# Patient Record
Sex: MALE | Race: WHITE | NOT HISPANIC OR LATINO
[De-identification: names, ages, dates, MRNs, and addresses within clinical notes are randomized per-mention and may not be internally consistent; named-entity substitution may affect disease eponyms.]

---

## 2024-03-25 PROBLEM — Z00.00 ENCOUNTER FOR PREVENTIVE HEALTH EXAMINATION: Status: ACTIVE | Noted: 2024-03-25

## 2024-04-18 ENCOUNTER — LABORATORY RESULT (OUTPATIENT)
Age: 54
End: 2024-04-18

## 2024-04-18 ENCOUNTER — APPOINTMENT (OUTPATIENT)
Dept: NEPHROLOGY | Facility: CLINIC | Age: 54
End: 2024-04-18
Payer: MEDICAID

## 2024-04-18 VITALS — WEIGHT: 167 LBS | BODY MASS INDEX: 26.84 KG/M2 | HEIGHT: 66 IN

## 2024-04-18 VITALS — DIASTOLIC BLOOD PRESSURE: 79 MMHG | SYSTOLIC BLOOD PRESSURE: 121 MMHG | HEART RATE: 67 BPM

## 2024-04-18 VITALS — HEART RATE: 66 BPM | SYSTOLIC BLOOD PRESSURE: 125 MMHG | DIASTOLIC BLOOD PRESSURE: 81 MMHG

## 2024-04-18 DIAGNOSIS — F51.04 PSYCHOPHYSIOLOGIC INSOMNIA: ICD-10-CM

## 2024-04-18 DIAGNOSIS — M54.50 LOW BACK PAIN, UNSPECIFIED: ICD-10-CM

## 2024-04-18 DIAGNOSIS — G89.29 LOW BACK PAIN, UNSPECIFIED: ICD-10-CM

## 2024-04-18 PROCEDURE — 99204 OFFICE O/P NEW MOD 45 MIN: CPT | Mod: 25

## 2024-04-18 PROCEDURE — 93000 ELECTROCARDIOGRAM COMPLETE: CPT

## 2024-04-18 RX ORDER — DUPILUMAB 300 MG/2ML
300 INJECTION, SOLUTION SUBCUTANEOUS
Refills: 0 | Status: ACTIVE | COMMUNITY

## 2024-04-18 NOTE — ADDENDUM
[FreeTextEntry1] : To complete EKG in clinic.  Given requisition for renal study and ultrasound. To complete tomorrow at [facility].  BP/HR taken in different positions (sitting standing and lying down) and d/w pt Self-monitoring at home advised i.e. BP, FS, etc. Medications updated Diet/healthy lifestyle counselling New labs ordered.  Follow up in 2-3 months.

## 2024-04-18 NOTE — END OF VISIT
[TextEntry] : All medical record entries have been made by the scribe, MAGNUS HANNON, at Dr. Germain Avila direction and personally dictated by me on 4/18/24. I have received the chart and agree that the record accurately reflects my personal performance of the history, physical exam, assessment, and plan. I have also personally directed, reviewed and agreed with the chart.

## 2024-04-18 NOTE — HISTORY OF PRESENT ILLNESS
[FreeTextEntry1] : 54 y/o M w/ PMHX of hematuria and eczema. This is his initial visit.   Pt. admits hematuria. Wife reports 2 episodes. Sees Dr. Garcia at [facility]. Wife also reports completed urinalysis and "found something."  Admits FMHX of kidney disease. Wife reports he had ulcers and GI issues since his teen years. Admits blood is visible in his urine.  Denies seeing a urologist.    Admits both endo/colonoscopy. Completed by Dr.  Admits occasional blood in the stool.   Admits occasional back pain. Says it localizes on his sides.   Wife admits that he lost weight. He lost about 20-30lb. Weight was around at least 190lb. He lost this weight in 6 months. Says he was trying to lose weight - wife reports he refrained from eating because of grief of losing both his parents.  Denies hospitalizations. Denies surgeries.  Pt. reports labwork supports that heme and iron was low.  Decline in RBC size.   Admits medication adherence - only to Dupixent for severe eczema.  Taking it for about 6-7 years. They are biweekly injections of 300mg. Followed by Dr. Aguilar at [facility] (T: [phone])  Wife admits he has poor eyesight.   ROS is negative otherwise.  FAMILY HX: Mom (d, 74) from kidney disease.  Dad (d)  SOCIAL HX: Born and raised in [place]. .  Works office job. No pets. Has not traveled recently.

## 2024-04-19 LAB
24R-OH-CALCIDIOL SERPL-MCNC: 52 PG/ML
25(OH)D3 SERPL-MCNC: 20.9 NG/ML
ALBUMIN SERPL ELPH-MCNC: 4.9 G/DL
ALP BLD-CCNC: 65 U/L
ALT SERPL-CCNC: 15 U/L
ANION GAP SERPL CALC-SCNC: 15 MMOL/L
APPEARANCE: CLEAR
APTT BLD: 35 SEC
AST SERPL-CCNC: 20 U/L
BACTERIA: NEGATIVE /HPF
BILIRUB SERPL-MCNC: 0.3 MG/DL
BILIRUBIN URINE: NEGATIVE
BLOOD URINE: ABNORMAL
BUN SERPL-MCNC: 13 MG/DL
C3 SERPL-MCNC: 114 MG/DL
C4 SERPL-MCNC: 29 MG/DL
CALCIUM SERPL-MCNC: 9.7 MG/DL
CALCIUM SERPL-MCNC: 9.7 MG/DL
CAST: 0 /LPF
CHLORIDE ?TM UR-SCNC: 94 MMOL/L
CHLORIDE SERPL-SCNC: 101 MMOL/L
CHOLEST SERPL-MCNC: 183 MG/DL
CO2 SERPL-SCNC: 24 MMOL/L
COLOR: YELLOW
CREAT SERPL-MCNC: 0.95 MG/DL
CREAT SPEC-SCNC: 62 MG/DL
CREAT/PROT UR: 0.1 RATIO
CRP SERPL-MCNC: <3 MG/L
CYSTATIN C SERPL-MCNC: 1.03 MG/L
EGFR: 96 ML/MIN/1.73M2
EPITHELIAL CELLS: 0 /HPF
ERYTHROCYTE [SEDIMENTATION RATE] IN BLOOD BY WESTERGREN METHOD: 6 MM/HR
ESTIMATED AVERAGE GLUCOSE: 105 MG/DL
FERRITIN SERPL-MCNC: 160 NG/ML
FOLATE SERPL-MCNC: 16.6 NG/ML
GFR/BSA.PRED SERPLBLD CYS-BASED-ARV: 77 ML/MIN/1.73M2
GLUCOSE QUALITATIVE U: NEGATIVE MG/DL
GLUCOSE SERPL-MCNC: 82 MG/DL
HBA1C MFR BLD HPLC: 5.3 %
HBV SURFACE AB SER QL: NONREACTIVE
HBV SURFACE AG SER QL: NONREACTIVE
HCT VFR BLD CALC: 39.8 %
HCV AB SER QL: NONREACTIVE
HCV S/CO RATIO: 0.08 S/CO
HCYS SERPL-MCNC: 10.5 UMOL/L
HDLC SERPL-MCNC: 61 MG/DL
HGB BLD-MCNC: 12.6 G/DL
INR PPP: 0.97 RATIO
IRON SATN MFR SERPL: 18 %
IRON SERPL-MCNC: 62 UG/DL
KETONES URINE: NEGATIVE MG/DL
LDLC SERPL CALC-MCNC: 113 MG/DL
LEUKOCYTE ESTERASE URINE: NEGATIVE
MAGNESIUM SERPL-MCNC: 2.3 MG/DL
MCHC RBC-ENTMCNC: 25.7 PG
MCHC RBC-ENTMCNC: 31.7 GM/DL
MCV RBC AUTO: 81.2 FL
MICROSCOPIC-UA: NORMAL
NITRITE URINE: NEGATIVE
NONHDLC SERPL-MCNC: 123 MG/DL
OSMOLALITY UR: 418 MOSM/KG
PARATHYROID HORMONE INTACT: 34 PG/ML
PH URINE: 6.5
PHOSPHATE SERPL-MCNC: 3.9 MG/DL
PLATELET # BLD AUTO: 221 K/UL
POTASSIUM SERPL-SCNC: 3.8 MMOL/L
POTASSIUM UR-SCNC: 33.7 MMOL/L
PROT SERPL-MCNC: 7 G/DL
PROT UR-MCNC: 6 MG/DL
PROTEIN URINE: NEGATIVE MG/DL
PSA FREE FLD-MCNC: 46 %
PSA FREE SERPL-MCNC: 0.29 NG/ML
PSA SERPL-MCNC: 0.63 NG/ML
PT BLD: 11 SEC
RBC # BLD: 4.9 M/UL
RBC # FLD: 13.1 %
RED BLOOD CELLS URINE: 7 /HPF
RHEUMATOID FACT SER QL: <10 IU/ML
SODIUM ?TM SUB UR QN: 96 MMOL/L
SODIUM SERPL-SCNC: 140 MMOL/L
SPECIFIC GRAVITY URINE: 1.01
T3FREE SERPL-MCNC: 3.3 PG/ML
T3RU NFR SERPL: 1 TBI
T4 FREE SERPL-MCNC: 1.5 NG/DL
T4 SERPL-MCNC: 9.4 UG/DL
THYROGLOB AB SERPL-ACNC: <20 IU/ML
THYROPEROXIDASE AB SERPL IA-ACNC: <10 IU/ML
TIBC SERPL-MCNC: 343 UG/DL
TRIGL SERPL-MCNC: 53 MG/DL
TSH SERPL-ACNC: 1.34 UIU/ML
UIBC SERPL-MCNC: 281 UG/DL
URATE SERPL-MCNC: 6.3 MG/DL
UROBILINOGEN URINE: 0.2 MG/DL
VIT B12 SERPL-MCNC: 577 PG/ML
WBC # FLD AUTO: 4.89 K/UL
WHITE BLOOD CELLS URINE: 2 /HPF

## 2024-04-20 LAB
ALBUMIN MFR SERPL ELPH: 63.5 %
ALBUMIN SERPL-MCNC: 4.4 G/DL
ALBUMIN/GLOB SERPL: 1.7 RATIO
ALPHA1 GLOB MFR SERPL ELPH: 3.3 %
ALPHA1 GLOB SERPL ELPH-MCNC: 0.2 G/DL
ALPHA2 GLOB MFR SERPL ELPH: 8.9 %
ALPHA2 GLOB SERPL ELPH-MCNC: 0.6 G/DL
B-GLOBULIN MFR SERPL ELPH: 10.6 %
B-GLOBULIN SERPL ELPH-MCNC: 0.7 G/DL
BACTERIA UR CULT: NORMAL
DEPRECATED KAPPA LC FREE/LAMBDA SER: 1.68 RATIO
DEPRECATED LTX IGE RAST QL: 0
GAMMA GLOB FLD ELPH-MCNC: 1 G/DL
GAMMA GLOB MFR SERPL ELPH: 13.7 %
IGA 24H UR QL IFE: NORMAL
IGA SER QL IEP: 128 MG/DL
IGG SER QL IEP: 982 MG/DL
IGM SER QL IEP: 49 MG/DL
INTERPRETATION SERPL IEP-IMP: NORMAL
KAPPA LC CSF-MCNC: 0.97 MG/DL
KAPPA LC SERPL-MCNC: 1.63 MG/DL
LTX IGE QN: <0.1 KUA/L
M PROTEIN SPEC IFE-MCNC: NORMAL
PROT SERPL-MCNC: 7 G/DL
PROT SERPL-MCNC: 7 G/DL

## 2024-04-21 ENCOUNTER — TRANSCRIPTION ENCOUNTER (OUTPATIENT)
Age: 54
End: 2024-04-21

## 2024-04-22 LAB
ANA SER IF-ACNC: NEGATIVE
GASTRIN SERPL-MCNC: 24 PG/ML

## 2024-04-23 LAB
ALP BONE SERPL-MCNC: 12.3 UG/L
C1Q IMMUNE COMPLEX: <1.2 UG EQ/ML

## 2024-04-24 LAB — COLLAGEN CTX SERPL-MCNC: 216 PG/ML

## 2024-04-25 LAB — METHYLMALONATE SERPL-SCNC: 127 NMOL/L

## 2024-05-03 ENCOUNTER — TRANSCRIPTION ENCOUNTER (OUTPATIENT)
Age: 54
End: 2024-05-03

## 2024-05-03 ENCOUNTER — APPOINTMENT (OUTPATIENT)
Dept: NEPHROLOGY | Facility: CLINIC | Age: 54
End: 2024-05-03
Payer: MEDICAID

## 2024-05-03 ENCOUNTER — APPOINTMENT (OUTPATIENT)
Dept: UROLOGY | Facility: CLINIC | Age: 54
End: 2024-05-03
Payer: MEDICAID

## 2024-05-03 PROCEDURE — 99204 OFFICE O/P NEW MOD 45 MIN: CPT

## 2024-05-03 PROCEDURE — 99214 OFFICE O/P EST MOD 30 MIN: CPT

## 2024-05-03 NOTE — HISTORY OF PRESENT ILLNESS
[FreeTextEntry1] : Name MADDIE LI MRN [id number]  Sep 16 1970 ------------------------------------------------------------------------------------------------------------------------------------------- Date of First Visit:  2024  Referring Provider/PCP: Dr. Avila  ------------------------------------------------------------------------------------------------------------------------------------------- CC: kidney stones  History of Present Illness: MADDIE LI is a 53-year-old male with no significant PMH who presents for evaluation of kidney stones. He recently saw Dr. Avila for evaluation of gross hematuria and bilateral back/flank pain. CT demonstrated mild/moderate left hydronephrosis secondary to 3 stones in the upper ureter, largest 5x12mm as well as bilateral non obstructing stones (see full CT report below). Labs on : creatinine 0.95, WBC 4.89, UCx negative. He has been experiencing intermittent bilateral back pain, he is unsure which side is more bothersome but believes bilateral pain was present at the time of the CT scan. Had one episode of nausea but denies fever, chills, vomiting, severe pain, urinary complaints. Not taking medications for symptoms.   Imaging: CT scan from 2024 (report only). Findings: Sub centimeter, nonobstructive stones in each kidney, at least 3 particles on the right and 2 on the left; close proximity of small stones might result in underestimation of overall number. Largest on the left is about 5mm, on the right 8mm. Right urinary tract otherwise unremarkable. Mild/moderate hydronephrosis on the left, etiology being additional stones within the mid to upper ureter. 3 particles are present, largest and most caudal being about 5x12mm, above this, others are 1-2mm each. Subsequent ureter, bladder unremarkable.   Previous urine cultures:  2024: <10,000 CFU/mL Normal Urogenital Rita  Kidney Stone History:  First-time stone former - No Concurrent asymptomatic stone(s) - Yes Previous stone surgeries - No   Previous passed stones - No  Comorbidities - non-contributory Family history of kidney stones - Brother, both parents  Previous metabolic evaluation - No

## 2024-05-03 NOTE — ASSESSMENT
[FreeTextEntry1] : Assessment:   MADDIE LI is a 53 year old male with urolithiasis. He currently has a 5x12mm stone in the left upper ureter with hydronephrosis and bilateral non obstructing stones with bilateral back vs flank pain.  Unremarkable labs from 4/18.  I discussed the management of urolithiasis with the patient:   Watchful Waiting +/- Medical Expulsive Therapy (MET):   We can continue to watch the stone and will generally give up to 4 weeks for stone passage. The likelihood of passage goes down with increased stone size and more proximal location. However, I explained that there is always a risk that the stone could become more symptomatic (pain, infection) as it passes or not pass at all, which would require surgical treatment. MET is a conservative option wherein medications (most commonly, an alpha-blocker) and analgesia are prescribed to help expedite the passage of the ureteral stone. In general, it is reserved for distal stones between 6-10 mm, though contemporary scientific evidence is conflicting. The strength of the evidence for MET in small (<6 mm) stones in the distal ureter is weak, though it might still have a clinical benefit in larger ureteral stones (>5 mm). At the same time, risks associated with short-course alpha-blocker therapy are very low and likely outweighed by the potential upside of hastening stone passage.      Shock Wave Lithotripsy (SWL):   This is the least invasive form of surgery for stones and an excellent option for select stones. For ureteral stones, SWL is limited to the upper ureter. I explained how the procedure is performed and the concept behind shock waves. Procedural success is dependent on several stone and environmental factors such as the stone composition or density on CT, the presence or absence of hydronephrosis, size of the stone, stone location, and skin-to-stone distance on CT scan (patients body habitus). For these reasons, not all stones/patients are good candidates for SWL. The chances of being stone free after SWL are often much lower compared to other modalities, such as ureteroscopy, except in select cases where stone-free rates are comparable. Therefore, there is a risk that the patient would need subsequent procedures to render them stone-free. Since this is a non-invasive procedure, we are relying on the kidney to spontaneously pass the resultant stone fragments. At the same time, this procedure does carry some perioperative risks, mainly bleeding and infection, as well as the small risk of developing obstruction due to passage of stone fragments, which could require urgent placement of a double-J ureteral stent or nephrostomy tube.    Ureteroscopy (URS):   I explained the technique in detail and how it is performed. Though more invasive than SWL, stone-free rates are higher with this procedure for many stones, including those in the distal ureter and larger stones in the kidney and other segments of the ureter. Unlike SWL, its success rate is far less dependent on stone hardness, an unknown in many patients until the time of surgery. With regards to surgical outcomes, true (100%) stone-free rates approach 90% for ureteral stones and are more conservative (~60%) for renal stones - importantly, these rates are directly correlated to the total stone size/burden. Very commonly, a ureteral stent is left in place at the conclusion of the procedure, but only if needed. I explained that up to 80% of patients have some degree of stent-related symptoms, including flank pain, urinary urgency, pain with urination, blood in the urine, or general discomfort. If a stent is placed, it would need to be removed either cystoscopically under local anesthesia or it may have a string left externally through the urethra for removal in the office approximately 1 week after the procedure.  Risks of ureteroscopy include, but are not limited to, infection (5%), bleeding, injury to the bladder or ureter, ureteral perforation, ureteral stricture, and other risks involved with general anesthesia. There is also the risk that the procedure needs to be staged into more than one session based on the patient's internal anatomy and the size of the stone(s). Finally, dilation of the ureter and/or ureteral stent placement prior to definitive ureteroscopy may be necessary to achieve ureteral access safely.    I explained all these options to the patient and my assessment of the risks and benefits of each procedure for their stone(s).     I have answered all the patient's questions.  Given his persistent symptoms, we also discussed urgently inserting a ureteral stent to relieve obstruction.  We also discussed emergent symptoms and when to seek immediate care including fever, chills, nausea, vomiting, worsening pain, or urinary complaints. He would like to continue to monitor his symptoms but expressed understanding of importance of proceeding to ER for urgent evaluation if emergent symptoms occur.   Plan:  -Ibuprofen as needed for pain -Present to the [facility] ED for further evaluation and possible stent placement if he develops fever, chills, nausea, vomiting, severe pain, urinary symptoms. -If pain is well managed, follow up with Dr. Rocha as scheduled on 5/8 for further discussion of stone management options.  -Requested CT images

## 2024-05-03 NOTE — REASON FOR VISIT
[Initial Visit ___] : [unfilled] is here today for an initial visit  for [unfilled] [Home] : at home, [unfilled] , at the time of the visit. [Medical Office: ([facility])___] : at the medical office located in  [Patient] : the patient [Spouse] : spouse

## 2024-05-08 ENCOUNTER — APPOINTMENT (OUTPATIENT)
Dept: UROLOGY | Facility: CLINIC | Age: 54
End: 2024-05-08
Payer: MEDICAID

## 2024-05-08 VITALS
WEIGHT: 168 LBS | HEART RATE: 72 BPM | BODY MASS INDEX: 27 KG/M2 | OXYGEN SATURATION: 98 % | SYSTOLIC BLOOD PRESSURE: 107 MMHG | DIASTOLIC BLOOD PRESSURE: 67 MMHG | TEMPERATURE: 97.9 F | HEIGHT: 66 IN

## 2024-05-08 DIAGNOSIS — Z84.1 FAMILY HISTORY OF DISORDERS OF KIDNEY AND URETER: ICD-10-CM

## 2024-05-08 DIAGNOSIS — R31.9 HEMATURIA, UNSPECIFIED: ICD-10-CM

## 2024-05-08 DIAGNOSIS — N20.0 CALCULUS OF KIDNEY: ICD-10-CM

## 2024-05-08 PROCEDURE — 99214 OFFICE O/P EST MOD 30 MIN: CPT

## 2024-05-08 RX ORDER — TAMSULOSIN HYDROCHLORIDE 0.4 MG/1
0.4 CAPSULE ORAL
Qty: 14 | Refills: 0 | Status: ACTIVE | COMMUNITY
Start: 2024-05-08 | End: 1900-01-01

## 2024-05-08 RX ORDER — OXYBUTYNIN CHLORIDE 10 MG/1
10 TABLET, EXTENDED RELEASE ORAL
Qty: 14 | Refills: 0 | Status: ACTIVE | COMMUNITY
Start: 2024-05-08 | End: 1900-01-01

## 2024-05-08 RX ORDER — PHENAZOPYRIDINE HYDROCHLORIDE 100 MG/1
100 TABLET ORAL 3 TIMES DAILY
Qty: 9 | Refills: 0 | Status: ACTIVE | COMMUNITY
Start: 2024-05-08 | End: 1900-01-01

## 2024-05-08 NOTE — REVIEW OF SYSTEMS
[see HPI] : see HPI [Negative] : Heme/Lymph Island Pedicle Flap-Requiring Vessel Identification Text: The defect edges were debeveled with a #15 scalpel blade.  Given the location of the defect, shape of the defect and the proximity to free margins an island pedicle advancement flap was deemed most appropriate.  Using a sterile surgical marker, an appropriate advancement flap was drawn, based on the axial vessel mentioned above, incorporating the defect, outlining the appropriate donor tissue and placing the expected incisions within the relaxed skin tension lines where possible.    The area thus outlined was incised deep to adipose tissue with a #15 scalpel blade.  The skin margins were undermined to an appropriate distance in all directions around the primary defect and laterally outward around the island pedicle utilizing iris scissors.  There was minimal undermining beneath the pedicle flap.

## 2024-05-09 NOTE — ADDENDUM
[FreeTextEntry1] : I, Dr. Landon Rocha, personally performed the evaluation and management (E/M) services for this established patient with new problem(s)/exacerbation of existing condition(s). That E/M includes conducting the clinically appropriate interval history &/or physical exam, assessing all new/exacerbated conditions, and establishing a new care plan. My NP, Mayra Mora, was here to observe my E/M services for this patient.

## 2024-05-09 NOTE — HISTORY OF PRESENT ILLNESS
[FreeTextEntry1] :     Name MADDIE LI MRN [id number]  Sep 16 1970 ------------------------------------------------------------------------------------------------------------------------------------------- Date of First Visit: 2024 Referring Provider/PCP: Dr. Avila ------------------------------------------------------------------------------------------------------------------------------------------- CC: kidney stones  History of Present Illness: MADDIE LI is a 53-year-old male with no significant PMH who presents for evaluation of kidney stones. He recently saw Dr. Avila for evaluation of gross hematuria and bilateral back/flank pain. CT demonstrated mild/moderate left hydronephrosis secondary to 3 stones in the upper ureter, largest 5x12mm as well as bilateral non obstructing stones (see full CT report below). Labs on : creatinine 0.95, WBC 4.89, UCx negative. He has been experiencing intermittent bilateral back pain, he is unsure which side is more bothersome but believes bilateral pain was present at the time of the CT scan. Had one episode of nausea but denies fever, chills, vomiting, severe pain, urinary complaints. Not taking medications for symptoms.  Imaging: CT scan from 2024 (report only). Findings: Sub centimeter, nonobstructive stones in each kidney, at least 3 particles on the right and 2 on the left; close proximity of small stones might result in underestimation of overall number. Largest on the left is about 5mm, on the right 8mm. Right urinary tract otherwise unremarkable. Mild/moderate hydronephrosis on the left, etiology being additional stones within the mid to upper ureter. 3 particles are present, largest and most caudal being about 5x12mm, above this, others are 1-2mm each. Subsequent ureter, bladder unremarkable.  Previous urine cultures: 2024: <10,000 CFU/mL Normal Urogenital Rita  Kidney Stone History: First-time stone former - No Concurrent asymptomatic stone(s) - Yes Previous stone surgeries - No Previous passed stones - No Comorbidities - non-contributory Family history of kidney stones - Brother, both parents Previous metabolic evaluation - No -------------------------------------------------------------------------------------------------------------------------------------------  Interval History (2024):  Here for follow up and to further discuss surgical management of kidney stones. Personal review of CT scan shows 3 stones in the left ureter, the largest measuring 5x12mm and several bilateral nonobstructing stones.

## 2024-05-09 NOTE — ASSESSMENT
[FreeTextEntry1] : Assessment:   MADDIE LI is a 53 year M with urolithiasis. He currently has 3 stones in the left ureter, the largest measuring 5x12mm and several bilateral nonobstructing stones.    I discussed the management of urolithiasis with the patient:    Watchful Waiting +/- Medical Expulsive Therapy (MET):   We can continue to watch the stone and will generally give up to 4 weeks for stone passage. The likelihood of passage goes down with increased stone size and more proximal location. However, I explained that there is always a risk that the stone could become more symptomatic (pain, infection) as it passes or not pass at all, which would require surgical treatment. MET is a conservative option wherein medications (most commonly, an alpha-blocker) and analgesia are prescribed to help expedite the passage of the ureteral stone. In general, it is reserved for distal stones between 6-10 mm, though contemporary scientific evidence is conflicting. The strength of the evidence for MET in small (<6 mm) stones in the distal ureter is weak, though it might still have a clinical benefit in larger ureteral stones (>5 mm). At the same time, risks associated with short-course alpha-blocker therapy are very low and likely outweighed by the potential upside of hastening stone passage.      Shock Wave Lithotripsy (SWL):   This is the least invasive form of surgery for stones and an excellent option for select stones. For ureteral stones, SWL is limited to the upper ureter. I explained how the procedure is performed and the concept behind shock waves. Procedural success is dependent on several stone and environmental factors such as the stone composition or density on CT, the presence or absence of hydronephrosis, size of the stone, stone location, and skin-to-stone distance on CT scan (patients body habitus). For these reasons, not all stones/patients are good candidates for SWL. The chances of being stone free after SWL are often much lower compared to other modalities, such as ureteroscopy, except in select cases where stone-free rates are comparable. Therefore, there is a risk that the patient would need subsequent procedures to render them stone-free. Since this is a non-invasive procedure, we are relying on the kidney to spontaneously pass the resultant stone fragments. At the same time, this procedure does carry some perioperative risks, mainly bleeding and infection, as well as the small risk of developing obstruction due to passage of stone fragments, which could require urgent placement of a double-J ureteral stent or nephrostomy tube.    Ureteroscopy (URS):   I explained the technique in detail and how it is performed. Though more invasive than SWL, stone-free rates are higher with this procedure for many stones, including those in the distal ureter and larger stones in the kidney and other segments of the ureter. Unlike SWL, its success rate is far less dependent on stone hardness, an unknown in many patients until the time of surgery. With regards to surgical outcomes, true (100%) stone-free rates approach 90% for ureteral stones and are more conservative (~60%) for renal stones - importantly, these rates are directly correlated to the total stone size/burden. Very commonly, a ureteral stent is left in place at the conclusion of the procedure, but only if needed. I explained that up to 80% of patients have some degree of stent-related symptoms, including flank pain, urinary urgency, pain with urination, blood in the urine, or general discomfort. If a stent is placed, it would need to be removed either cystoscopically under local anesthesia or it may have a string left externally through the urethra for removal in the office approximately 1 week after the procedure.  Risks of ureteroscopy include, but are not limited to, infection (5%), bleeding, injury to the bladder or ureter, ureteral perforation, ureteral stricture, and other risks involved with general anesthesia. There is also the risk that the procedure needs to be staged into more than one session based on the patient's internal anatomy and the size of the stone(s). Finally, dilation of the ureter and/or ureteral stent placement prior to definitive ureteroscopy may be necessary to achieve ureteral access safely.    I explained all these options to the patient and my assessment of the risks and benefits of each procedure for their stone(s).     I have answered all the patients questions and the patient has elected to undergo a ureteroscopy.     Plan:   -Schedule for left ureteroscopy with laser lithotripsy with CVAC  -Pre-operative labs, UA/UCx recently done  -Upload CT into PACS -Medical clearance requested  -Post op meds sent - tamsulosin, oxybutynin, pyridium

## 2024-05-13 ENCOUNTER — TRANSCRIPTION ENCOUNTER (OUTPATIENT)
Age: 54
End: 2024-05-13

## 2024-05-13 NOTE — ASU PATIENT PROFILE, ADULT - NS PREOP UNDERSTANDS INFO
bring photo id, insurance , credit cards, npo from midnight, . NO jewelry , no valuables ,. No smoking/drugs /alcohol. Wear loose comfort clothes. Must have an escort. Address, phone number given No solid food/dairy/candy/gum after midnight; water allowed before 10:00am; reminded to come with photo ID/insurance card; escort to have a photo ID; dress in comfortable clothes; no jewelries/contact lens; no smoking/drinking alcohol/illicit drug use today; address and callback number was given./yes

## 2024-05-13 NOTE — ASU PATIENT PROFILE, ADULT - NSICDXPASTMEDICALHX_GEN_ALL_CORE_FT
PAST MEDICAL HISTORY:  Hematuria     Kidney stones      PAST MEDICAL HISTORY:  Eczema     Hematuria     Kidney stones

## 2024-05-14 ENCOUNTER — RESULT REVIEW (OUTPATIENT)
Age: 54
End: 2024-05-14

## 2024-05-14 ENCOUNTER — OUTPATIENT (OUTPATIENT)
Dept: OUTPATIENT SERVICES | Facility: HOSPITAL | Age: 54
LOS: 1 days | Discharge: HOME | End: 2024-05-14
Payer: MEDICAID

## 2024-05-14 ENCOUNTER — TRANSCRIPTION ENCOUNTER (OUTPATIENT)
Age: 54
End: 2024-05-14

## 2024-05-14 ENCOUNTER — APPOINTMENT (OUTPATIENT)
Dept: UROLOGY | Facility: AMBULATORY SURGERY CENTER | Age: 54
End: 2024-05-14

## 2024-05-14 VITALS
RESPIRATION RATE: 16 BRPM | HEART RATE: 64 BPM | SYSTOLIC BLOOD PRESSURE: 134 MMHG | DIASTOLIC BLOOD PRESSURE: 78 MMHG | TEMPERATURE: 98 F

## 2024-05-14 VITALS
RESPIRATION RATE: 15 BRPM | HEIGHT: 66 IN | OXYGEN SATURATION: 100 % | TEMPERATURE: 97 F | WEIGHT: 168.21 LBS | HEART RATE: 65 BPM

## 2024-05-14 DIAGNOSIS — Z98.890 OTHER SPECIFIED POSTPROCEDURAL STATES: Chronic | ICD-10-CM

## 2024-05-14 PROCEDURE — 52356 CYSTO/URETERO W/LITHOTRIPSY: CPT | Mod: LT

## 2024-05-14 PROCEDURE — 88300 SURGICAL PATH GROSS: CPT | Mod: 26

## 2024-05-14 PROCEDURE — 53899 UNLISTED PX URINARY SYSTEM: CPT | Mod: LT

## 2024-05-14 PROCEDURE — 52353 CYSTOURETERO W/LITHOTRIPSY: CPT | Mod: LT,XS

## 2024-05-14 PROCEDURE — 74420 UROGRAPHY RTRGR +-KUB: CPT | Mod: 26

## 2024-05-14 DEVICE — GUIDEWIRE SENSOR DUAL-FLEX NITINOL STRAIGHT .038" X 150CM: Type: IMPLANTABLE DEVICE | Site: LEFT | Status: FUNCTIONAL

## 2024-05-14 DEVICE — CATH STEERABLE IRR ASPIR CVAC 2: Type: IMPLANTABLE DEVICE | Site: LEFT | Status: FUNCTIONAL

## 2024-05-14 DEVICE — URETERAL CATH DUAL LUMEN 10FR 54CM: Type: IMPLANTABLE DEVICE | Site: LEFT | Status: FUNCTIONAL

## 2024-05-14 DEVICE — URETERAL STENT TRIA SOFT 6FR 26MM: Type: IMPLANTABLE DEVICE | Site: LEFT | Status: FUNCTIONAL

## 2024-05-14 DEVICE — LASER FIBER SOLTIVE 200 BALL TIP: Type: IMPLANTABLE DEVICE | Site: LEFT | Status: FUNCTIONAL

## 2024-05-14 DEVICE — URETERAL SHEATH NAVIGATOR HD 12/14FR X 46CM: Type: IMPLANTABLE DEVICE | Site: LEFT | Status: FUNCTIONAL

## 2024-05-14 RX ORDER — SODIUM CHLORIDE 9 MG/ML
1000 INJECTION, SOLUTION INTRAVENOUS
Refills: 0 | Status: DISCONTINUED | OUTPATIENT
Start: 2024-05-14 | End: 2024-05-14

## 2024-05-14 RX ORDER — PHENAZOPYRIDINE HCL 100 MG
200 TABLET ORAL ONCE
Refills: 0 | Status: COMPLETED | OUTPATIENT
Start: 2024-05-14 | End: 2024-05-14

## 2024-05-14 RX ORDER — OXYBUTYNIN CHLORIDE 5 MG
1 TABLET ORAL
Refills: 0 | DISCHARGE

## 2024-05-14 RX ORDER — TAMSULOSIN HYDROCHLORIDE 0.4 MG/1
1 CAPSULE ORAL
Refills: 0 | DISCHARGE

## 2024-05-14 RX ORDER — DUPILUMAB 300 MG/2ML
300 INJECTION, SOLUTION SUBCUTANEOUS
Refills: 0 | DISCHARGE

## 2024-05-14 RX ORDER — KETOROLAC TROMETHAMINE 30 MG/ML
15 SYRINGE (ML) INJECTION EVERY 6 HOURS
Refills: 0 | Status: DISCONTINUED | OUTPATIENT
Start: 2024-05-14 | End: 2024-05-14

## 2024-05-14 RX ORDER — PHENAZOPYRIDINE HCL 100 MG
2 TABLET ORAL
Refills: 0 | DISCHARGE

## 2024-05-14 RX ORDER — KETOROLAC TROMETHAMINE 30 MG/ML
15 SYRINGE (ML) INJECTION EVERY 6 HOURS
Refills: 0 | Status: COMPLETED | OUTPATIENT
Start: 2024-05-14

## 2024-05-14 RX ORDER — ACETAMINOPHEN 500 MG
1000 TABLET ORAL ONCE
Refills: 0 | Status: COMPLETED | OUTPATIENT
Start: 2024-05-14 | End: 2024-05-14

## 2024-05-14 RX ORDER — ONDANSETRON 8 MG/1
4 TABLET, FILM COATED ORAL ONCE
Refills: 0 | Status: DISCONTINUED | OUTPATIENT
Start: 2024-05-14 | End: 2024-05-14

## 2024-05-14 RX ORDER — TAMSULOSIN HYDROCHLORIDE 0.4 MG/1
0.4 CAPSULE ORAL ONCE
Refills: 0 | Status: COMPLETED | OUTPATIENT
Start: 2024-05-14 | End: 2024-05-14

## 2024-05-14 RX ORDER — FENTANYL CITRATE 50 UG/ML
25 INJECTION INTRAVENOUS
Refills: 0 | Status: DISCONTINUED | OUTPATIENT
Start: 2024-05-14 | End: 2024-05-14

## 2024-05-14 RX ORDER — TAMSULOSIN HYDROCHLORIDE 0.4 MG/1
0.4 CAPSULE ORAL ONCE
Refills: 0 | Status: COMPLETED | OUTPATIENT
Start: 2024-05-14 | End: 2025-04-12

## 2024-05-14 RX ORDER — PHENAZOPYRIDINE HCL 100 MG
200 TABLET ORAL ONCE
Refills: 0 | Status: COMPLETED | OUTPATIENT
Start: 2024-05-14 | End: 2025-04-12

## 2024-05-14 RX ADMIN — Medication 15 MILLIGRAM(S): at 17:30

## 2024-05-14 RX ADMIN — Medication 200 MILLIGRAM(S): at 16:46

## 2024-05-14 RX ADMIN — FENTANYL CITRATE 25 MICROGRAM(S): 50 INJECTION INTRAVENOUS at 16:29

## 2024-05-14 RX ADMIN — TAMSULOSIN HYDROCHLORIDE 0.4 MILLIGRAM(S): 0.4 CAPSULE ORAL at 17:41

## 2024-05-14 RX ADMIN — Medication 1000 MILLIGRAM(S): at 12:09

## 2024-05-14 RX ADMIN — FENTANYL CITRATE 25 MICROGRAM(S): 50 INJECTION INTRAVENOUS at 16:56

## 2024-05-14 NOTE — BRIEF OPERATIVE NOTE - OPERATION/FINDINGS
Severely impacted L mid-ureteral stone measuring approx. 1.2cm, fragmented and relocated to L renal pelvis; CVAC 2.0 used to perform laser lithotripsy and suctioning of stone; additional 4 stones noted in L mid and lower poles measuring approx. 3-6mm each, dusted and fragmented with all residual stone burden and dust suctioned using CVAC 2.0; 6F x 26cm ureteral stent placed on a string

## 2024-05-15 ENCOUNTER — NON-APPOINTMENT (OUTPATIENT)
Age: 54
End: 2024-05-15

## 2024-05-15 PROBLEM — L30.9 DERMATITIS, UNSPECIFIED: Chronic | Status: ACTIVE | Noted: 2024-05-14

## 2024-05-16 PROBLEM — R31.9 HEMATURIA, UNSPECIFIED: Chronic | Status: ACTIVE | Noted: 2024-05-13

## 2024-05-16 PROBLEM — N20.0 CALCULUS OF KIDNEY: Chronic | Status: ACTIVE | Noted: 2024-05-13

## 2024-05-16 LAB — SURGICAL PATHOLOGY STUDY: SIGNIFICANT CHANGE UP

## 2024-05-17 ENCOUNTER — APPOINTMENT (OUTPATIENT)
Dept: UROLOGY | Facility: CLINIC | Age: 54
End: 2024-05-17
Payer: MEDICAID

## 2024-05-17 PROCEDURE — 99213 OFFICE O/P EST LOW 20 MIN: CPT

## 2024-05-17 NOTE — HISTORY OF PRESENT ILLNESS
[FreeTextEntry1] : Name MADDIE LI MRN [id number]  Sep 16 1970 ------------------------------------------------------------------------------------------------------------------------------------------- Date of First Visit: 2024 Referring Provider/PCP: Dr. Avila ------------------------------------------------------------------------------------------------------------------------------------------- CC: kidney stones  History of Present Illness: MADDIE LI is a 53-year-old male with no significant PMH who presents for evaluation of kidney stones. He recently saw Dr. Avila for evaluation of gross hematuria and bilateral back/flank pain. CT demonstrated mild/moderate left hydronephrosis secondary to 3 stones in the upper ureter, largest 5x12mm as well as bilateral non obstructing stones (see full CT report below). Labs on : creatinine 0.95, WBC 4.89, UCx negative. He has been experiencing intermittent bilateral back pain, he is unsure which side is more bothersome but believes bilateral pain was present at the time of the CT scan. Had one episode of nausea but denies fever, chills, vomiting, severe pain, urinary complaints. Not taking medications for symptoms.  Imaging: CT scan from 2024 (report only). Findings: Sub centimeter, nonobstructive stones in each kidney, at least 3 particles on the right and 2 on the left; close proximity of small stones might result in underestimation of overall number. Largest on the left is about 5mm, on the right 8mm. Right urinary tract otherwise unremarkable. Mild/moderate hydronephrosis on the left, etiology being additional stones within the mid to upper ureter. 3 particles are present, largest and most caudal being about 5x12mm, above this, others are 1-2mm each. Subsequent ureter, bladder unremarkable.  Previous urine cultures: 2024: <10,000 CFU/mL Normal Urogenital Rita  Kidney Stone History: First-time stone former - No Concurrent asymptomatic stone(s) - Yes Previous stone surgeries - No Previous passed stones - No Comorbidities - non-contributory Family history of kidney stones - Brother, both parents Previous metabolic evaluation - No ------------------------------------------------------------------------------------------------------------------------------------------- Interval History (2024): Here for follow up and to further discuss surgical management of kidney stones. Personal review of CT scan shows 3 stones in the left ureter, the largest measuring 5x12mm and several bilateral nonobstructing stones. ------------------------------------------------------------------------------------------------------------------------------------------- Interval History (2024): s/p left ureteroscopy with laser lithotripsy with CVAC . Patient presents today for stent removal and postoperative follow-up.  He feels well. Denies fever, chills, nausea, vomiting. Moderate stent-related symptoms - dysuria, urgency.  Procedure: Stent was left on a string and removed in the office today without difficulty. Stent was intact. Patient tolerated the procedure well.

## 2024-05-17 NOTE — ASSESSMENT
[FreeTextEntry1] : Assessment: MADDIE LI is a 54 y/o M s/p left ureteroscopy with laser lithotripsy with CVAC 5/14. Has intermittent right flank pain, suspect musculoskeletal etiology.  -We reviewed common symptoms after stent removal and advised that they should resolve within the next couple of days. Patient knows to contact the office if they experience any fevers (temp >100.4) or any other concerns.      -We discussed generalized stone prevention strategies, metabolic evaluation (serum chemistry profile and 24-hour urine collection +/- PTH testing if serum Ca is elevated), and the natural history of kidney stone disease. I explained that first-time stone formers generally do not need a complete metabolic work-up in the absence of risk factors. However, without behavioral and dietary modification, they are at a heightened risk of developing future symptomatic stones: 10% at 2 years, 20% at 5 years, and 30% at 10 years ([facility] data). In recurrent stone formers, the risk of recurrence is considerably higher with a lifetime recurrence rate of 60-80%. Risk factors include stone type, total stone volume, family history, multiple stones, and many medical comorbidities (DM, HTN, HLD, obesity, gout, HPT).    Generalized stone prevention counseling was provided as follows:     1. High fluid intake. The goal should be to drink enough to produce 2.5 liters (quarts) of urine daily. Most studies have shown that high fluid volume intake will decrease the risk of stone formation. Research generally indicates that there appears to be little difference between hard and soft water in the formation of kidney stones. Lemon juice added to the water may also aid with increasing urine pH, urine citric acid and reducing stone formation.      2. Dietary recommendations. The key to all dietary recommendations is moderation.      Decrease animal protein consumption. High protein intake increases urinary calcium, oxalate, and uric acid excretion into the urine - all of which will increase the probability of stone formation.   Decrease sodium intake by avoiding heavily salted foods, and resist adding salt to food at the table. Sodium restriction is widely recognized as an important element of dietary prevention of recurrent kidney stones.    Dietary calcium.  Patient should consume a daily recommended allowance of dietary calcium (800-1200 mg). Patients who take in too much Ca or too little Ca are at an increased risk of recurrent stone formation. Dietary calcium is preferable to supplements. Calcium supplementation can be safe when the calcium is taken with meals, rather than at bedtime. Another suggestion for patients who have been recommended to take calcium supplements for their bone health is to consider using calcium citrate, an over-the-counter preparation that provides 950 mg of calcium citrate and 200 mg of elemental calcium in each tablet.   Avoid excess intake of foods with high oxalate content, including dark leafy greens, beets, nuts, tea, coffee, and chocolate. Strict avoidance of oxalate is not necessary in most cases.   Avoid high doses of vitamin C. Intake should be limited to a maximum daily dose of less than 2 gm (2,000 mg).    Plan: -Follow up CT in 2 weeks with TEB to follow -Micheal

## 2024-05-23 LAB
CELL MATERIAL STONE EST-MCNT: SIGNIFICANT CHANGE UP
LABORATORY COMMENT REPORT: SIGNIFICANT CHANGE UP
NIDUS STONE QN: SIGNIFICANT CHANGE UP

## 2024-05-31 ENCOUNTER — APPOINTMENT (OUTPATIENT)
Dept: CT IMAGING | Facility: CLINIC | Age: 54
End: 2024-05-31
Payer: MEDICAID

## 2024-05-31 PROCEDURE — 74176 CT ABD & PELVIS W/O CONTRAST: CPT

## 2024-07-12 ENCOUNTER — APPOINTMENT (OUTPATIENT)
Dept: UROLOGY | Facility: CLINIC | Age: 54
End: 2024-07-12
Payer: MEDICAID

## 2024-07-12 DIAGNOSIS — N20.0 CALCULUS OF KIDNEY: ICD-10-CM

## 2024-07-12 PROCEDURE — 99214 OFFICE O/P EST MOD 30 MIN: CPT | Mod: 95

## 2024-08-14 ENCOUNTER — APPOINTMENT (OUTPATIENT)
Dept: UROLOGY | Facility: CLINIC | Age: 54
End: 2024-08-14
Payer: MEDICAID

## 2024-08-14 DIAGNOSIS — R82.991 HYPOCITRATURIA: ICD-10-CM

## 2024-08-14 PROCEDURE — 99214 OFFICE O/P EST MOD 30 MIN: CPT | Mod: 95

## 2024-08-14 RX ORDER — POTASSIUM CITRATE 15 MEQ/1
15 MEQ TABLET, EXTENDED RELEASE ORAL
Qty: 60 | Refills: 11 | Status: ACTIVE | COMMUNITY
Start: 2024-08-14 | End: 1900-01-01

## 2024-08-14 NOTE — HISTORY OF PRESENT ILLNESS
[FreeTextEntry1] : Name MADDIE LI MRN [id number]  Sep 16 1970 ------------------------------------------------------------------------------------------------------------------------------------------- Date of First Visit: 2024 Referring Provider/PCP: Dr. Avila ------------------------------------------------------------------------------------------------------------------------------------------- CC: kidney stones  History of Present Illness: MADDIE LI is a 53-year-old male with no significant PMH who presents for evaluation of kidney stones. He recently saw Dr. Avila for evaluation of gross hematuria and bilateral back/flank pain. CT demonstrated mild/moderate left hydronephrosis secondary to 3 stones in the upper ureter, largest 5x12mm as well as bilateral non obstructing stones (see full CT report below). Labs on : creatinine 0.95, WBC 4.89, UCx negative. He has been experiencing intermittent bilateral back pain, he is unsure which side is more bothersome but believes bilateral pain was present at the time of the CT scan. Had one episode of nausea but denies fever, chills, vomiting, severe pain, urinary complaints. Not taking medications for symptoms.  Imaging: CT scan from 2024 (report only). Findings: Sub centimeter, nonobstructive stones in each kidney, at least 3 particles on the right and 2 on the left; close proximity of small stones might result in underestimation of overall number. Largest on the left is about 5mm, on the right 8mm. Right urinary tract otherwise unremarkable. Mild/moderate hydronephrosis on the left, etiology being additional stones within the mid to upper ureter. 3 particles are present, largest and most caudal being about 5x12mm, above this, others are 1-2mm each. Subsequent ureter, bladder unremarkable.  Previous urine cultures: 2024: <10,000 CFU/mL Normal Urogenital Rita  Kidney Stone History: First-time stone former - No Concurrent asymptomatic stone(s) - Yes Previous stone surgeries - No Previous passed stones - No Comorbidities - non-contributory Family history of kidney stones - Brother, both parents Previous metabolic evaluation - No ------------------------------------------------------------------------------------------------------------------------------------------- Interval History (2024): Here for follow up and to further discuss surgical management of kidney stones. Personal review of CT scan shows 3 stones in the left ureter, the largest measuring 5x12mm and several bilateral nonobstructing stones. ------------------------------------------------------------------------------------------------------------------------------------------- Interval History (2024): s/p left ureteroscopy with laser lithotripsy with CVAC . Patient presents today for stent removal and postoperative follow-up. He feels well. Denies fever, chills, nausea, vomiting. Moderate stent-related symptoms - dysuria, urgency. Procedure: Stent was left on a string and removed in the office today without difficulty. Stent was intact. Patient tolerated the procedure well. ------------------------------------------------------------------------------------------------------------------------------------------- Interval History (2024): Feels well but continues to have intermittent right flank pain. CT scan 24 IMPRESSION: Nonobstructing stones in the right kidney measuring 6 mm in the upper pole, 6 mm in the lower pole and 1.5 x 0.5 cm in the interpolar region Interval resolution of left hydronephrosis and no longer any evidence of stone in left kidney or ureter. ------------------------------------------------------------------------------------------------------------------------------------------- Interval History (2024): s/p left ureteroscopy with laser lithotripsy with CVAC . Stone-free on left based on recent CT with numerous non-obstructing stones in the right kidney (largest 1.3 cm). Continues to have intermittent right flank pain. Does state he has been urinating more frequently. Has coffee 3-4 days/week.   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

## 2024-08-14 NOTE — ASSESSMENT
[FreeTextEntry1] : Assessment: MADDIE LI is a 52 y/o M s/p left ureteroscopy with laser lithotripsy with CVAC 5/14. Stone-free on left based on recent CT with numerous non-obstructing stones in the right kidney (largest 1.3 cm). Has intermittent right flank pain, suspect musculoskeletal etiology. Recent Litholink notable for hypocitraturia and hyperuricosuria. New LUTS since left URS - urgency.   Discussed potential treatment options as stone removal would be the way to rule out nephrolithiasis as source of pain. He will consider surveillance vs treatment and d/w his wife.  Plan: -K citrate 15 mEq BID - risks discussed - BMP in 1 week -Low purine diet (avoid all nuts, in particular) -Repeat Litholink 1 month -Follow up TEB to discuss 24HU results -Avoid bladder irritants, including caffeine, alcohol, spicy/acidic foods, and tobacco-containing products

## 2025-09-15 ENCOUNTER — LABORATORY RESULT (OUTPATIENT)
Age: 55
End: 2025-09-15

## 2025-09-15 ENCOUNTER — APPOINTMENT (OUTPATIENT)
Dept: NEPHROLOGY | Facility: CLINIC | Age: 55
End: 2025-09-15
Payer: MEDICAID

## 2025-09-15 VITALS — BODY MASS INDEX: 27.12 KG/M2 | WEIGHT: 168 LBS

## 2025-09-15 PROCEDURE — 99215 OFFICE O/P EST HI 40 MIN: CPT | Mod: 25

## 2025-09-16 LAB
24R-OH-CALCIDIOL SERPL-MCNC: 81.7 PG/ML
25(OH)D3 SERPL-MCNC: 19.2 NG/ML
ALBUMIN SERPL ELPH-MCNC: 4.7 G/DL
ALP BLD-CCNC: 49 U/L
ALT SERPL-CCNC: 29 U/L
ANION GAP SERPL CALC-SCNC: 14 MMOL/L
APPEARANCE: CLEAR
AST SERPL-CCNC: 35 U/L
BACTERIA: NEGATIVE /HPF
BILIRUB SERPL-MCNC: 0.3 MG/DL
BILIRUBIN URINE: NEGATIVE
BLOOD URINE: NEGATIVE
BUN SERPL-MCNC: 15 MG/DL
CALCIUM SERPL-MCNC: 10.1 MG/DL
CALCIUM SERPL-MCNC: 10.1 MG/DL
CAST: 0 /LPF
CHLORIDE SERPL-SCNC: 102 MMOL/L
CHOLEST SERPL-MCNC: 194 MG/DL
CO2 SERPL-SCNC: 23 MMOL/L
COLOR: YELLOW
CREAT SERPL-MCNC: 0.92 MG/DL
CREAT SPEC-SCNC: 67 MG/DL
CREAT/PROT UR: 0.1 RATIO
CRP SERPL-MCNC: <3 MG/L
CYSTATIN C SERPL-MCNC: 0.82 MG/L
EGFRCR SERPLBLD CKD-EPI 2021: 98 ML/MIN/1.73M2
EPITHELIAL CELLS: 0 /HPF
ERYTHROCYTE [SEDIMENTATION RATE] IN BLOOD BY WESTERGREN METHOD: 4 MM/HR
ESTIMATED AVERAGE GLUCOSE: 108 MG/DL
GFR/BSA.PRED SERPLBLD CYS-BASED-ARV: 103 ML/MIN/1.73M2
GLUCOSE QUALITATIVE U: NEGATIVE MG/DL
GLUCOSE SERPL-MCNC: 92 MG/DL
HBA1C MFR BLD HPLC: 5.4 %
HCT VFR BLD CALC: 30.5 %
HDLC SERPL-MCNC: 60 MG/DL
HGB BLD-MCNC: 9.9 G/DL
KETONES URINE: NEGATIVE MG/DL
LDLC SERPL-MCNC: 122 MG/DL
LEUKOCYTE ESTERASE URINE: ABNORMAL
MAGNESIUM SERPL-MCNC: 2.4 MG/DL
MCHC RBC-ENTMCNC: 26.5 PG
MCHC RBC-ENTMCNC: 32.5 G/DL
MCV RBC AUTO: 81.6 FL
MICROSCOPIC-UA: NORMAL
NITRITE URINE: NEGATIVE
NONHDLC SERPL-MCNC: 135 MG/DL
PARATHYROID HORMONE INTACT: 43 PG/ML
PH URINE: 7
PHOSPHATE SERPL-MCNC: 3.5 MG/DL
PLATELET # BLD AUTO: 214 K/UL
POTASSIUM SERPL-SCNC: 4.3 MMOL/L
PROT SERPL-MCNC: 7.2 G/DL
PROT UR-MCNC: 5 MG/DL
PROTEIN URINE: NEGATIVE MG/DL
PSA FREE FLD-MCNC: 43 %
PSA FREE SERPL-MCNC: 0.27 NG/ML
PSA SERPL-MCNC: 0.63 NG/ML
RBC # BLD: 3.74 M/UL
RBC # FLD: 15.8 %
RED BLOOD CELLS URINE: 3 /HPF
SODIUM SERPL-SCNC: 139 MMOL/L
SPECIFIC GRAVITY URINE: 1.01
T3FREE SERPL-MCNC: 2.65 PG/ML
T3RU NFR SERPL: 1 TBI
T4 FREE SERPL-MCNC: 1.4 NG/DL
T4 SERPL-MCNC: 7.6 UG/DL
THYROGLOB AB SERPL-ACNC: 15 IU/ML
THYROPEROXIDASE AB SERPL IA-ACNC: 9.4 IU/ML
TRIGL SERPL-MCNC: 71 MG/DL
TSH SERPL-ACNC: 1.32 UIU/ML
URATE SERPL-MCNC: 5.8 MG/DL
UROBILINOGEN URINE: 1 MG/DL
WBC # FLD AUTO: 4.4 K/UL
WHITE BLOOD CELLS URINE: 1 /HPF

## 2025-09-17 VITALS — HEART RATE: 72 BPM | SYSTOLIC BLOOD PRESSURE: 114 MMHG | DIASTOLIC BLOOD PRESSURE: 80 MMHG

## 2025-09-17 VITALS — HEART RATE: 72 BPM | SYSTOLIC BLOOD PRESSURE: 120 MMHG | DIASTOLIC BLOOD PRESSURE: 70 MMHG

## 2025-09-18 LAB — ALP BONE SERPL-MCNC: 7.5 UG/L

## 2025-09-23 LAB — COLLAGEN CTX SERPL-MCNC: 103 PG/ML

## (undated) DEVICE — VENODYNE/SCD SLEEVE CALF MEDIUM

## (undated) DEVICE — TUBING RANGER FLUID IRRIGATION SET DISP

## (undated) DEVICE — SYR LUER LOK 50CC

## (undated) DEVICE — GLV 7.5 PROTEXIS (WHITE)

## (undated) DEVICE — DRAPE C ARM 41X74"

## (undated) DEVICE — PACK CYSTO

## (undated) DEVICE — SYR CATH TIP 2 OZ

## (undated) DEVICE — GOWN ROYAL SILK XL

## (undated) DEVICE — BOSTON SCIENTIFIC UROLOK II SCOPE ADAPTOR